# Patient Record
Sex: FEMALE | Race: WHITE
[De-identification: names, ages, dates, MRNs, and addresses within clinical notes are randomized per-mention and may not be internally consistent; named-entity substitution may affect disease eponyms.]

---

## 2019-11-19 ENCOUNTER — HOSPITAL ENCOUNTER (EMERGENCY)
Dept: HOSPITAL 33 - ED | Age: 59
LOS: 1 days | Discharge: HOME | End: 2019-11-20
Payer: MEDICARE

## 2019-11-19 DIAGNOSIS — I25.10: ICD-10-CM

## 2019-11-19 DIAGNOSIS — Z79.899: ICD-10-CM

## 2019-11-19 DIAGNOSIS — R44.3: Primary | ICD-10-CM

## 2019-11-19 DIAGNOSIS — Z87.820: ICD-10-CM

## 2019-11-19 DIAGNOSIS — Z79.891: ICD-10-CM

## 2019-11-19 DIAGNOSIS — E10.9: ICD-10-CM

## 2019-11-19 DIAGNOSIS — F32.9: ICD-10-CM

## 2019-11-19 DIAGNOSIS — Z79.4: ICD-10-CM

## 2019-11-19 LAB
ALBUMIN SERPL-MCNC: 4.5 G/DL (ref 3.5–5)
ALP SERPL-CCNC: 90 U/L (ref 38–126)
ALT SERPL-CCNC: 18 U/L (ref 0–35)
AMPHETAMINES UR QL: NEGATIVE
ANION GAP SERPL CALC-SCNC: 15.9 MEQ/L (ref 5–15)
APAP SPEC-MCNC: < 10 UG/ML (ref 10–30)
AST SERPL QL: 34 U/L (ref 14–36)
BARBITURATES UR QL: NEGATIVE
BASOPHILS # BLD AUTO: 0.26 10*3/UL (ref 0–0.4)
BASOPHILS NFR BLD AUTO: 2.4 % (ref 0–0.4)
BENZODIAZ UR QL SCN: POSITIVE
BILIRUB BLD-MCNC: 0.6 MG/DL (ref 0.2–1.3)
BUN SERPL-MCNC: 10 MG/DL (ref 7–17)
CALCIUM SPEC-MCNC: 9.5 MG/DL (ref 8.4–10.2)
CHLORIDE SERPL-SCNC: 104 MMOL/L (ref 98–107)
CO2 SERPL-SCNC: 29 MMOL/L (ref 22–30)
COCAINE UR QL SCN: NEGATIVE
CREAT SERPL-MCNC: 0.55 MG/DL (ref 0.52–1.04)
EOSINOPHIL # BLD AUTO: 0.22 10*3/UL (ref 0–0.5)
ETHANOL SERPL-MCNC: < 10 MG/DL (ref 0–10)
GLUCOSE SERPL-MCNC: 84 MG/DL (ref 74–106)
GLUCOSE UR-MCNC: NEGATIVE MG/DL
HCT VFR BLD AUTO: 45.4 % (ref 35–47)
HGB BLD-MCNC: 14.5 GM/DL (ref 12–16)
LYMPHOCYTES # SPEC AUTO: 2.68 10*3/UL (ref 1–4.6)
MCH RBC QN AUTO: 28.9 PG (ref 26–32)
MCHC RBC AUTO-ENTMCNC: 31.9 G/DL (ref 32–36)
METHADONE UR QL: NEGATIVE
MONOCYTES # BLD AUTO: 0.83 10*3/UL (ref 0–1.3)
OPIATES UR QL: NEGATIVE
PCP UR QL CFM>20 NG/ML: NEGATIVE
PLATELET # BLD AUTO: 304 K/MM3 (ref 150–450)
POTASSIUM SERPLBLD-SCNC: 3.7 MMOL/L (ref 3.5–5.1)
PROT SERPL-MCNC: 8.2 G/DL (ref 6.3–8.2)
PROT UR STRIP-MCNC: NEGATIVE MG/DL
RBC # BLD AUTO: 5.02 M/MM3 (ref 4.1–5.4)
RBC #/AREA URNS HPF: (no result) /HPF (ref 0–2)
SALICYLATES SERPL-MCNC: < 1 MG/DL (ref 2–20)
SODIUM SERPL-SCNC: 145 MMOL/L (ref 137–145)
THC UR QL SCN: NEGATIVE
WBC # BLD AUTO: 11.1 K/MM3 (ref 4–10.5)
WBC #/AREA URNS HPF: (no result) /HPF (ref 0–5)

## 2019-11-19 PROCEDURE — 36415 COLL VENOUS BLD VENIPUNCTURE: CPT

## 2019-11-19 PROCEDURE — 80053 COMPREHEN METABOLIC PANEL: CPT

## 2019-11-19 PROCEDURE — 99285 EMERGENCY DEPT VISIT HI MDM: CPT

## 2019-11-19 PROCEDURE — 93005 ELECTROCARDIOGRAM TRACING: CPT

## 2019-11-19 PROCEDURE — 80307 DRUG TEST PRSMV CHEM ANLYZR: CPT

## 2019-11-19 PROCEDURE — 70450 CT HEAD/BRAIN W/O DYE: CPT

## 2019-11-19 PROCEDURE — G0480 DRUG TEST DEF 1-7 CLASSES: HCPCS

## 2019-11-19 PROCEDURE — 90791 PSYCH DIAGNOSTIC EVALUATION: CPT

## 2019-11-19 PROCEDURE — 81001 URINALYSIS AUTO W/SCOPE: CPT

## 2019-11-19 PROCEDURE — 96372 THER/PROPH/DIAG INJ SC/IM: CPT

## 2019-11-19 PROCEDURE — 85025 COMPLETE CBC W/AUTO DIFF WBC: CPT
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
## 2019-11-20 VITALS — HEART RATE: 89 BPM | DIASTOLIC BLOOD PRESSURE: 71 MMHG | SYSTOLIC BLOOD PRESSURE: 93 MMHG | OXYGEN SATURATION: 98 %

## 2019-11-20 NOTE — XRAY
Indication: Headache 5 days.



Multiple contiguous axial images obtained through the head without contrast.



Comparison: None



Ventriculosulcal pattern appears symmetric with incidental prominent Virchow

Skyler spaces bilaterally.  No acute intracranial hemorrhage, abnormal

extra-axial fluid collection, or mass effect.  Fourth ventricle is midline

without hydrocephalus.  Gray-white matter differentiation preserved.  Bony

calvarium intact.  Visualized paranasal sinuses and mastoid air cells are

clear.



Impression: No acute intracranial abnormalities.



CTDI  60.95

## 2020-01-03 ENCOUNTER — HOSPITAL ENCOUNTER (EMERGENCY)
Dept: HOSPITAL 33 - ED | Age: 60
Discharge: HOME | End: 2020-01-03
Payer: MEDICARE

## 2020-01-03 VITALS — DIASTOLIC BLOOD PRESSURE: 70 MMHG | SYSTOLIC BLOOD PRESSURE: 126 MMHG | HEART RATE: 78 BPM

## 2020-01-03 VITALS — OXYGEN SATURATION: 98 %

## 2020-01-03 DIAGNOSIS — R44.1: Primary | ICD-10-CM

## 2020-01-03 LAB
ALBUMIN SERPL-MCNC: 4.1 G/DL (ref 3.5–5)
ALP SERPL-CCNC: 102 U/L (ref 38–126)
ALT SERPL-CCNC: 16 U/L (ref 0–35)
AMPHETAMINES UR QL: POSITIVE
ANION GAP SERPL CALC-SCNC: 9 MEQ/L (ref 5–15)
APAP SPEC-MCNC: < 10 UG/ML (ref 10–30)
AST SERPL QL: 28 U/L (ref 14–36)
BARBITURATES UR QL: NEGATIVE
BASOPHILS # BLD AUTO: 0.16 10*3/UL (ref 0–0.4)
BASOPHILS NFR BLD AUTO: 1.5 % (ref 0–0.4)
BENZODIAZ UR QL SCN: POSITIVE
BILIRUB BLD-MCNC: 0.9 MG/DL (ref 0.2–1.3)
BUN SERPL-MCNC: 10 MG/DL (ref 7–17)
CALCIUM SPEC-MCNC: 9.5 MG/DL (ref 8.4–10.2)
CHLORIDE SERPL-SCNC: 103 MMOL/L (ref 98–107)
CO2 SERPL-SCNC: 33 MMOL/L (ref 22–30)
COCAINE UR QL SCN: NEGATIVE
CREAT SERPL-MCNC: 0.61 MG/DL (ref 0.52–1.04)
EOSINOPHIL # BLD AUTO: 0.17 10*3/UL (ref 0–0.5)
ETHANOL SERPL-MCNC: < 10 MG/DL (ref 0–10)
GLUCOSE SERPL-MCNC: 113 MG/DL (ref 74–106)
GLUCOSE UR-MCNC: NEGATIVE MG/DL
HCT VFR BLD AUTO: 39.7 % (ref 35–47)
HGB BLD-MCNC: 12.7 GM/DL (ref 12–16)
LYMPHOCYTES # SPEC AUTO: 1.55 10*3/UL (ref 1–4.6)
MCH RBC QN AUTO: 29.1 PG (ref 26–32)
MCHC RBC AUTO-ENTMCNC: 32 G/DL (ref 32–36)
METHADONE UR QL: NEGATIVE
MONOCYTES # BLD AUTO: 1 10*3/UL (ref 0–1.3)
OPIATES UR QL: NEGATIVE
PCP UR QL CFM>20 NG/ML: NEGATIVE
PLATELET # BLD AUTO: 267 K/MM3 (ref 150–450)
POTASSIUM SERPLBLD-SCNC: 3.6 MMOL/L (ref 3.5–5.1)
PROT SERPL-MCNC: 7.9 G/DL (ref 6.3–8.2)
PROT UR STRIP-MCNC: NEGATIVE MG/DL
RBC # BLD AUTO: 4.37 M/MM3 (ref 4.1–5.4)
RBC #/AREA URNS HPF: (no result) /HPF (ref 0–2)
SALICYLATES SERPL-MCNC: < 1 MG/DL (ref 2–20)
SODIUM SERPL-SCNC: 141 MMOL/L (ref 137–145)
THC UR QL SCN: POSITIVE
WBC # BLD AUTO: 11 K/MM3 (ref 4–10.5)
WBC #/AREA URNS HPF: (no result) /HPF (ref 0–5)

## 2020-01-03 PROCEDURE — 93005 ELECTROCARDIOGRAM TRACING: CPT

## 2020-01-03 PROCEDURE — 70450 CT HEAD/BRAIN W/O DYE: CPT

## 2020-01-03 PROCEDURE — 71045 X-RAY EXAM CHEST 1 VIEW: CPT

## 2020-01-03 PROCEDURE — 81001 URINALYSIS AUTO W/SCOPE: CPT

## 2020-01-03 PROCEDURE — 82550 ASSAY OF CK (CPK): CPT

## 2020-01-03 PROCEDURE — G0480 DRUG TEST DEF 1-7 CLASSES: HCPCS

## 2020-01-03 PROCEDURE — 80307 DRUG TEST PRSMV CHEM ANLYZR: CPT

## 2020-01-03 PROCEDURE — 99284 EMERGENCY DEPT VISIT MOD MDM: CPT

## 2020-01-03 PROCEDURE — 80053 COMPREHEN METABOLIC PANEL: CPT

## 2020-01-03 PROCEDURE — 96360 HYDRATION IV INFUSION INIT: CPT

## 2020-01-03 PROCEDURE — P9612 CATHETERIZE FOR URINE SPEC: HCPCS

## 2020-01-03 PROCEDURE — 90791 PSYCH DIAGNOSTIC EVALUATION: CPT

## 2020-01-03 PROCEDURE — 85025 COMPLETE CBC W/AUTO DIFF WBC: CPT

## 2020-01-03 PROCEDURE — 36415 COLL VENOUS BLD VENIPUNCTURE: CPT

## 2020-01-03 NOTE — ERPHSYRPT
- History of Present Illness


Time Seen by Provider: 01/03/20 06:45


Source: patient, EMS


Exam Limitations: no limitations


Physician History: 





patient is a 51-year-old white female [place] at her 

request because his doctor he is patient apparently there was a incident that 

her home and EMS police and other first responders were involved.   She admits 

to hallucinations has a long involved story of worms which come out the ground 

and put himself together than right themselves and stringing get into her body.

  At  her main complaint to me is of swelling in the legs with cellulitis.  She 

says that has been present for one week.  She denies fever chills or sweats.  

She denies any auditory hallucinations but says that she sees things that other 

people just don't say.  She is surprisingly oriented x3


Timing/Duration: other (unknown)


Severity of Symptoms-Max: moderate


Severity of Symptoms-Current: moderate


Associated Symptoms: hallucinating, impaired concentration


Allergies/Adverse Reactions: 








NSAIDS (Non-Steroidal Anti-Inflamma Allergy (Severe, Verified 01/03/20 07:15)


 Swelling


Penicillins Allergy (Severe, Verified 01/03/20 07:15)


 Swelling


steroids Allergy (Severe, Uncoded 01/03/20 07:15)


 Swelling





Home Medications: 








Albuterol Common Canister*** [Proventil Common Canister***]  11/19/19 [History]


Amphet Asp/Amphet/D-Amphet [Dextroamp-Amphet ER 20 mg Cap] 20 mg PO 11/19/19 [

History]


Atenolol  11/19/19 [History]


Atorvastatin Calcium [Lipitor] 10 mg PO 11/19/19 [History]


Baclofen  11/19/19 [History]


Bupropion HCl [Wellbutrin Xl] 300 mg PO 11/19/19 [History]


Diazepam  11/19/19 [History]


Doxycycline Hyclate 100 mg PO 11/19/19 [History]


Duloxetine HCl [Cymbalta] 60 mg PO 11/19/19 [History]


Furosemide  11/19/19 [History]


Gabapentin  11/19/19 [History]


Insulin Detemir [Levemir] 100 unit SQ 11/19/19 [History]


Linaclotide [Linzess]  11/19/19 [History]


Oxycodone HCl/Acetaminophen [Oxycodone-Acetaminophen ] 1 each PO 11/19/19 

[History]


Oxycodone Myristate [Xtampza ER]  11/19/19 [History]


Potassium Chloride [Klor-Con] 20 meq PO 11/19/19 [History]


Trospium Chloride 1 tab PO DAILY 11/19/19 [History]





Hx Tetanus, Diphtheria Vaccination/Date Given: No


Hx Influenza Vaccination/Date Given: Yes


Hx Pneumococcal Vaccination/Date Given: No





- Past Medical History


Pertinent Past Medical History: Yes


Neurological History: Other


ENT History: No Pertinent History


Cardiac History: Coronary Artery Disease, Other


Respiratory History: Other


Endocrine Medical History: Diabetes Type II


Musculoskeletal History: Osteoarthritis


GI Medical History: GERD


 History: No Pertinent History


Psycho-Social History: Depression, Anxiety


Female Reproductive Disorders: No Pertinent History


Other Medical History: traumatic brain injury





- Past Surgical History


Past Surgical History: Yes


Neuro Surgical History: No Pertinent History


Cardiac: No Pertinent History


Respiratory: No Pertinent History


Gastrointestinal: No Pertinent History


Genitourinary: No Pertinent History


Musculoskeletal: No Pertinent History


Female Surgical History: No Pertinent History


Other Surgical History: c section X 2.  back.  breast reduction.  right leg; 

rods





- Social History


Smoking Status: Current every day smoker


How long have you smoked: years


Exposure to second hand smoke: Yes


Drug Use: marijuana


Patient Lives Alone: Yes





- Review of Systems


Constitutional: No Fever, No Chills


Eyes: No Symptoms


Ears, Nose, & Throat: No Symptoms


Respiratory: No Cough, No Dyspnea


Cardiac: No Chest Pain, No Edema, No Syncope


Abdominal/Gastrointestinal: No Abdominal Pain, No Nausea, No Vomiting, No 

Diarrhea


Genitourinary Symptoms: No Dysuria


Musculoskeletal: No Back Pain, No Neck Pain


Skin: No Rash


Neurological: No Dizziness, No Focal Weakness, No Sensory Changes


Psychological: Anxiety, Hallucinations, Mood Changes


Endocrine: No Symptoms


Hematologic/Lymphatic: No Symptoms


Immunological/Allergic: No Symptoms


All Other Systems: Reviewed and Negative





- Nursing Vital Signs


Nursing Vital Signs: 


 Initial Vital Signs











Temperature  97.6 F   01/03/20 06:33


 


Pulse Rate  85   01/03/20 06:33


 


Respiratory Rate  18   01/03/20 06:33


 


Blood Pressure  118/64   01/03/20 06:33


 


O2 Sat by Pulse Oximetry  100   01/03/20 06:33








 Pain Scale











Pain Intensity                 2

















- Physical Exam


General Appearance: mild distress


Eyes, Ears, Nose, Throat Exam: normal ENT inspection, moist mucous membranes


Neck Exam: normal inspection, non-tender, supple


Respiratory Exam: normal breath sounds, lungs clear, No respiratory distress


Cardiovascular Exam: regular rate/rhythm, No edema


Gastrointestinal/Abdominal Exam: soft, No tenderness, No distention


Extremities Exam: normal inspection, normal range of motion, No evidence of 

injury, No edema


Current Suicidality: denies suicide plan


Neurological Exam: alert, CNs II-XII nml as tested, oriented x 3, anxious


Appearance: disheveled, impaired insight


Behavior/Eye Contact/Speech: avoids eye contact, decreased rate of speech, 

intoxicated appearance


Thoughts/Hallucinations: tactile hallucinations, visual hallucinations, No 

auditory hallucinations


Skin Exam: other (bilateral lower bizarre red brawny edema  somewhat warm 

consistent with perhaps some cellulitis)


**SpO2 Interpretation**: normal


O2 Delivery: Room Air





- Course


Nursing assessment & vital signs reviewed: Yes





- Radiology Exams


  ** Chest


X-ray Interpretation: Reviewed by me





- CT Exams


  ** Head


CT Interpretation: Negative


Ordered Tests: 


 Active Orders 24 hr











 Category Date Time Status


 


 EKG-ER Only STAT Care  01/03/20 06:48 Active


 


 Psychiatric Consult STAT Cons  01/03/20 06:48 Active


 


 CHEST 1 VIEW (PORTABLE) Stat Exams  01/03/20 06:53 Completed


 


 HEAD WITHOUT CONTRAST [CT] Stat Exams  01/03/20 06:49 Completed


 


 ACETAMINOPHEN Stat Lab  01/03/20 06:48 Completed


 


 CBC W DIFF Stat Lab  01/03/20 06:48 Completed


 


 CK-Creatinine Phosphokinase Stat Lab  01/03/20 06:48 Completed


 


 CMP Stat Lab  01/03/20 06:48 Completed


 


 ETHYL ALCOHOL Stat Lab  01/03/20 06:48 Completed


 


 SALICYLATE Stat Lab  01/03/20 06:48 Completed


 


 UA W/RFX UR CULTURE Stat Lab  01/03/20 07:06 Completed


 


 Urine Triage Profile Stat Lab  01/03/20 07:06 Completed








Medication Summary














Discontinued Medications














Generic Name Dose Route Start Last Admin





  Trade Name Freq  PRN Reason Stop Dose Admin


 


Sodium Chloride  1,000 mls @ 999 mls/hr  01/03/20 06:48  01/03/20 08:52





  Sodium Chloride 0.9% 1000 Ml  IV  01/03/20 07:48  Infused





  .Q1H1M STA   Infusion





     





     





     





     


 


Sodium Chloride  Confirm  01/03/20 07:06  





  Sodium Chloride 0.9% 1000 Ml  Administered  01/03/20 07:07  





  Dose   





  1,000 mls @ ud   





  .ROUTE   





  .STK-MED ONE   





     





     





     





     











Lab/Rad Data: 


 Laboratory Result Diagrams





 01/03/20 06:48 





 01/03/20 06:48 





 Laboratory Results











  01/03/20 01/03/20 01/03/20 Range/Units





  07:06 07:06 06:48 


 


WBC     (4.0-10.5)  K/mm3


 


RBC     (4.1-5.4)  M/mm3


 


Hgb     (12.0-16.0)  gm/dl


 


Hct     (35-47)  %


 


MCV     ()  fl


 


MCH     (26-32)  pg


 


MCHC     (32-36)  g/dl


 


RDW     (11.5-14.0)  %


 


Plt Count     (150-450)  K/mm3


 


MPV     (6-9.5)  fl


 


Gran %     (36.0-66.0)  %


 


Eos # (Auto)     (0-0.5)  


 


Absolute Lymphs (auto)     (1.0-4.6)  


 


Absolute Monos (auto)     (0.0-1.3)  


 


Lymphocytes %     (24.0-44.0)  %


 


Monocytes %     (0.0-12.0)  %


 


Eosinophils %     (0.00-5.0)  %


 


Basophils %     (0.0-0.4)  %


 


Absolute Granulocytes     (1.4-6.9)  


 


Basophils #     (0-0.4)  


 


Sodium     (137-145)  mmol/L


 


Potassium     (3.5-5.1)  mmol/L


 


Chloride     ()  mmol/L


 


Carbon Dioxide     (22-30)  mmol/L


 


Anion Gap     (5-15)  MEQ/L


 


BUN     (7-17)  mg/dL


 


Creatinine     (0.52-1.04)  mg/dL


 


Estimated GFR     ML/MIN


 


Glucose     ()  mg/dL


 


Calcium     (8.4-10.2)  mg/dL


 


Total Bilirubin     (0.2-1.3)  mg/dL


 


AST     (14-36)  U/L


 


ALT     (0-35)  U/L


 


Alkaline Phosphatase     ()  U/L


 


Creatine Kinase    190 H  ()  U/L


 


Serum Total Protein     (6.3-8.2)  g/dL


 


Albumin     (3.5-5.0)  g/dL


 


Urine Color   ARA   (YELLOW)  


 


Urine Appearance   SLIGHTLY CLOUDY   (CLEAR)  


 


Urine pH   5.0   (5-6)  


 


Ur Specific Gravity   1.019   (1.005-1.025)  


 


Urine Protein   NEGATIVE   (Negative)  


 


Urine Ketones   SMALL   (NEGATIVE)  


 


Urine Blood   NEGATIVE   (0-5)  Feng/ul


 


Urine Nitrite   NEGATIVE   (NEGATIVE)  


 


Urine Bilirubin   NEGATIVE   (NEGATIVE)  


 


Urine Urobilinogen   4   (0-1)  mg/dL


 


Ur Leukocyte Esterase   NEGATIVE   (NEGATIVE)  


 


Urine WBC (Auto)   NONE   (0-5)  /HPF


 


Urine RBC (Auto)   NONE   (0-2)  /HPF


 


U Hyaline Cast (Auto)   3-5   (0-2)  /LPF


 


U Epithel Cells (Auto)   NONE   (FEW)  /HPF


 


Urine Bacteria (Auto)   NONE   (NEGATIVE)  /HPF


 


Other Casts (Auto)   NEGATIVE   (NEGATIVE)  /LPF


 


Urine Mucus (Auto)   SLIGHT   (NEGATIVE)  /HPF


 


Urine Culture Reflexed   NO   (NO)  


 


Urine Glucose   NEGATIVE   (NEGATIVE)  mg/dL


 


Salicylates     (2-20)  mg/dL


 


Urine Opiates Level  NEGATIVE    (NEGATIVE)  


 


Ur Methadone  NEGATIVE    (NEGATIVE)  


 


Acetaminophen     (10-30)  ug/ml


 


Urine Barbiturates  NEGATIVE    (NEGATIVE)  


 


Ur Phencyclidine (PCP)  NEGATIVE    (NEGATIVE)  


 


Urine Amphetamine  POSITIVE    (NEGATIVE)  


 


U Benzodiazepine Level  POSITIVE    (NEGATIVE)  


 


Urine Cocaine  NEGATIVE    (NEGATIVE)  


 


Urine Marijuana (THC)  POSITIVE    (NEGATIVE)  


 


Ethyl Alcohol     (0-10)  mg/dL














  01/03/20 01/03/20 Range/Units





  06:48 06:48 


 


WBC   11.0 H  (4.0-10.5)  K/mm3


 


RBC   4.37  (4.1-5.4)  M/mm3


 


Hgb   12.7  (12.0-16.0)  gm/dl


 


Hct   39.7  (35-47)  %


 


MCV   90.8  ()  fl


 


MCH   29.1  (26-32)  pg


 


MCHC   32.0  (32-36)  g/dl


 


RDW   14.1 H  (11.5-14.0)  %


 


Plt Count   267  (150-450)  K/mm3


 


MPV   11.9 H  (6-9.5)  fl


 


Gran %   73.8 H  (36.0-66.0)  %


 


Eos # (Auto)   0.17  (0-0.5)  


 


Absolute Lymphs (auto)   1.55  (1.0-4.6)  


 


Absolute Monos (auto)   1.00  (0.0-1.3)  


 


Lymphocytes %   14.1 L  (24.0-44.0)  %


 


Monocytes %   9.1  (0.0-12.0)  %


 


Eosinophils %   1.5  (0.00-5.0)  %


 


Basophils %   1.5  (0.0-0.4)  %


 


Absolute Granulocytes   8.09 H  (1.4-6.9)  


 


Basophils #   0.16  (0-0.4)  


 


Sodium  141   (137-145)  mmol/L


 


Potassium  3.6   (3.5-5.1)  mmol/L


 


Chloride  103   ()  mmol/L


 


Carbon Dioxide  33 H   (22-30)  mmol/L


 


Anion Gap  9.0   (5-15)  MEQ/L


 


BUN  10   (7-17)  mg/dL


 


Creatinine  0.61   (0.52-1.04)  mg/dL


 


Estimated GFR  > 60.0   ML/MIN


 


Glucose  113 H   ()  mg/dL


 


Calcium  9.5   (8.4-10.2)  mg/dL


 


Total Bilirubin  0.90   (0.2-1.3)  mg/dL


 


AST  28   (14-36)  U/L


 


ALT  16   (0-35)  U/L


 


Alkaline Phosphatase  102   ()  U/L


 


Creatine Kinase    ()  U/L


 


Serum Total Protein  7.9   (6.3-8.2)  g/dL


 


Albumin  4.1   (3.5-5.0)  g/dL


 


Urine Color    (YELLOW)  


 


Urine Appearance    (CLEAR)  


 


Urine pH    (5-6)  


 


Ur Specific Gravity    (1.005-1.025)  


 


Urine Protein    (Negative)  


 


Urine Ketones    (NEGATIVE)  


 


Urine Blood    (0-5)  Feng/ul


 


Urine Nitrite    (NEGATIVE)  


 


Urine Bilirubin    (NEGATIVE)  


 


Urine Urobilinogen    (0-1)  mg/dL


 


Ur Leukocyte Esterase    (NEGATIVE)  


 


Urine WBC (Auto)    (0-5)  /HPF


 


Urine RBC (Auto)    (0-2)  /HPF


 


U Hyaline Cast (Auto)    (0-2)  /LPF


 


U Epithel Cells (Auto)    (FEW)  /HPF


 


Urine Bacteria (Auto)    (NEGATIVE)  /HPF


 


Other Casts (Auto)    (NEGATIVE)  /LPF


 


Urine Mucus (Auto)    (NEGATIVE)  /HPF


 


Urine Culture Reflexed    (NO)  


 


Urine Glucose    (NEGATIVE)  mg/dL


 


Salicylates  < 1.0 L   (2-20)  mg/dL


 


Urine Opiates Level    (NEGATIVE)  


 


Ur Methadone    (NEGATIVE)  


 


Acetaminophen  < 10 L   (10-30)  ug/ml


 


Urine Barbiturates    (NEGATIVE)  


 


Ur Phencyclidine (PCP)    (NEGATIVE)  


 


Urine Amphetamine    (NEGATIVE)  


 


U Benzodiazepine Level    (NEGATIVE)  


 


Urine Cocaine    (NEGATIVE)  


 


Urine Marijuana (THC)    (NEGATIVE)  


 


Ethyl Alcohol  < 10   (0-10)  mg/dL














- Progress


Progress: improved





- Departure


Departure Disposition: Home


Clinical Impression: 


 Hallucination, visual





Condition: Stable


Critical Care Time: No


Referrals: 


FORREST DE LA CRUZ [Primary Care Provider] - 


Plan of Treatment: 


patient was evaluated by [facility] personnel they staffed with their 

psychiatrist and the patient will be allowed to be discharged to follow up as 

an outpatient she will be discharged to  her daughter with whom she will be 

staying.

## 2020-01-03 NOTE — XRAY
Indication: Altered mental status.



Comparison: None



Portable chest demonstrates cardiomegaly and left axilla pedro dissection.  No

acute cardiopulmonary abnormalities.  Bony thorax intact with mild

degenerative changes.

## 2020-01-03 NOTE — XRAY
Indication: Altered mental status.



Multiple contiguous axial images obtained through the head without contrast.



Comparison: None



Study is mildly degraded by motion artifact throughout even with repeat CT.

No gross acute intracranial hemorrhage, abnormal extra-axial fluid collection,

or mass effect.  Fourth ventricle is midline without hydrocephalus.

Gray-white matter differentiation preserved.  Bony calvarium is grossly

intact.  Visualized paranasal sinuses and mastoid air cells are clear.



Impression: Motion artifact.  No gross acute intracranial abnormalities.